# Patient Record
Sex: MALE | Race: WHITE | NOT HISPANIC OR LATINO | Employment: FULL TIME | URBAN - METROPOLITAN AREA
[De-identification: names, ages, dates, MRNs, and addresses within clinical notes are randomized per-mention and may not be internally consistent; named-entity substitution may affect disease eponyms.]

---

## 2022-07-05 ENCOUNTER — OFFICE VISIT (OUTPATIENT)
Dept: URGENT CARE | Facility: CLINIC | Age: 42
End: 2022-07-05
Payer: COMMERCIAL

## 2022-07-05 VITALS
RESPIRATION RATE: 18 BRPM | BODY MASS INDEX: 20.57 KG/M2 | HEIGHT: 66 IN | SYSTOLIC BLOOD PRESSURE: 144 MMHG | TEMPERATURE: 98 F | HEART RATE: 74 BPM | DIASTOLIC BLOOD PRESSURE: 83 MMHG | WEIGHT: 128 LBS | OXYGEN SATURATION: 99 %

## 2022-07-05 DIAGNOSIS — M25.562 LEFT KNEE PAIN, UNSPECIFIED CHRONICITY: Primary | ICD-10-CM

## 2022-07-05 PROCEDURE — 99213 OFFICE O/P EST LOW 20 MIN: CPT | Mod: S$GLB,,,

## 2022-07-05 PROCEDURE — 3079F DIAST BP 80-89 MM HG: CPT | Mod: CPTII,S$GLB,,

## 2022-07-05 PROCEDURE — 73562 X-RAY EXAM OF KNEE 3: CPT | Mod: FY,LT,S$GLB, | Performed by: RADIOLOGY

## 2022-07-05 PROCEDURE — 1160F RVW MEDS BY RX/DR IN RCRD: CPT | Mod: CPTII,S$GLB,,

## 2022-07-05 PROCEDURE — 1160F PR REVIEW ALL MEDS BY PRESCRIBER/CLIN PHARMACIST DOCUMENTED: ICD-10-PCS | Mod: CPTII,S$GLB,,

## 2022-07-05 PROCEDURE — 99213 PR OFFICE/OUTPT VISIT, EST, LEVL III, 20-29 MIN: ICD-10-PCS | Mod: S$GLB,,,

## 2022-07-05 PROCEDURE — 73562 XR KNEE 3 VIEW LEFT: ICD-10-PCS | Mod: FY,LT,S$GLB, | Performed by: RADIOLOGY

## 2022-07-05 PROCEDURE — 3079F PR MOST RECENT DIASTOLIC BLOOD PRESSURE 80-89 MM HG: ICD-10-PCS | Mod: CPTII,S$GLB,,

## 2022-07-05 PROCEDURE — 3008F PR BODY MASS INDEX (BMI) DOCUMENTED: ICD-10-PCS | Mod: CPTII,S$GLB,,

## 2022-07-05 PROCEDURE — 3008F BODY MASS INDEX DOCD: CPT | Mod: CPTII,S$GLB,,

## 2022-07-05 PROCEDURE — 3077F PR MOST RECENT SYSTOLIC BLOOD PRESSURE >= 140 MM HG: ICD-10-PCS | Mod: CPTII,S$GLB,,

## 2022-07-05 PROCEDURE — 3077F SYST BP >= 140 MM HG: CPT | Mod: CPTII,S$GLB,,

## 2022-07-05 PROCEDURE — 1159F PR MEDICATION LIST DOCUMENTED IN MEDICAL RECORD: ICD-10-PCS | Mod: CPTII,S$GLB,,

## 2022-07-05 PROCEDURE — 1159F MED LIST DOCD IN RCRD: CPT | Mod: CPTII,S$GLB,,

## 2022-07-05 RX ORDER — NAPROXEN 500 MG/1
500 TABLET ORAL 2 TIMES DAILY
Qty: 10 TABLET | Refills: 0 | Status: SHIPPED | OUTPATIENT
Start: 2022-07-05 | End: 2022-07-10

## 2022-07-05 NOTE — PROGRESS NOTES
Subjective:       Patient ID: Walter Ortiz is a 41 y.o. male.    Vitals:  vitals were not taken for this visit.     Chief Complaint: No chief complaint on file.    Patient presents to the clinic with pain in the left knee x Saturday. Patient has prior knee problems.     Knee Injury  This is a recurrent problem. Episode onset: 4 days  The problem occurs constantly. The problem has been gradually worsening. Associated symptoms include arthralgias and joint swelling. Pertinent negatives include no abdominal pain, chest pain, chills, diaphoresis, fatigue, fever, myalgias, nausea, neck pain, rash or vomiting. The symptoms are aggravated by walking, twisting, standing and bending. He has tried ice and NSAIDs for the symptoms. The treatment provided moderate relief.       Constitution: Negative. Negative for activity change, appetite change, chills, sweating, fatigue, fever and generalized weakness.   HENT: Negative.  Negative for ear pain, ear discharge, postnasal drip, sinus pain and sinus pressure.    Neck: neck negative. Negative for neck pain, neck stiffness and painful lymph nodes.   Cardiovascular: Negative.  Negative for chest pain, leg swelling, palpitations and sob on exertion.   Respiratory: Negative.  Negative for chest tightness, shortness of breath, stridor and wheezing.    Gastrointestinal: Negative.  Negative for abdominal pain, nausea, vomiting and constipation.   Endocrine: negative. cold intolerance.   Genitourinary: Negative.  Negative for dysuria and frequency.   Musculoskeletal: Positive for pain, trauma, joint pain, joint swelling and pain with walking. Negative for abnormal ROM of joint, back pain, muscle cramps, muscle ache and history of spine disorder.        L knee pain and swelling. PT reports having knee pain on and off for years, but this knee pain episode started after twisting knee cutting grass Saturday. Reports pain to medial knee with swelling. Reports knee pain full to bend.     Skin: Negative.  Negative for rash and hives.   Allergic/Immunologic: Negative for hives.   Neurological: Negative.  Negative for dizziness, light-headedness, passing out, disorientation and altered mental status.   Hematologic/Lymphatic: Negative.  Negative for swollen lymph nodes and easy bruising/bleeding. Does not bruise/bleed easily.   Psychiatric/Behavioral: Negative.  Negative for altered mental status, disorientation and confusion.       Objective:      Physical Exam   Constitutional: He is oriented to person, place, and time. He appears well-developed. He is cooperative.  Non-toxic appearance. He does not appear ill. No distress.   HENT:   Head: Normocephalic and atraumatic.   Ears:   Right Ear: Hearing, tympanic membrane, external ear and ear canal normal.   Left Ear: Hearing, tympanic membrane, external ear and ear canal normal.   Nose: Nose normal. No mucosal edema, rhinorrhea or nasal deformity. No epistaxis. Right sinus exhibits no maxillary sinus tenderness and no frontal sinus tenderness. Left sinus exhibits no maxillary sinus tenderness and no frontal sinus tenderness.   Mouth/Throat: Uvula is midline, oropharynx is clear and moist and mucous membranes are normal. No trismus in the jaw. Normal dentition. No uvula swelling. No posterior oropharyngeal erythema.   Eyes: Conjunctivae and lids are normal. Right eye exhibits no discharge. Left eye exhibits no discharge. No scleral icterus.   Neck: Trachea normal and phonation normal. Neck supple.   Cardiovascular: Normal rate, regular rhythm, normal heart sounds and normal pulses.   Pulmonary/Chest: Effort normal and breath sounds normal. No respiratory distress.   Abdominal: Normal appearance and bowel sounds are normal. He exhibits no distension and no mass. Soft. There is no abdominal tenderness.   Musculoskeletal:         General: No deformity.      Right knee: Normal. He exhibits normal range of motion, no swelling, no effusion, no ecchymosis, no  deformity, no laceration, no erythema, normal alignment, no LCL laxity, normal patellar mobility, no bony tenderness, normal meniscus and no MCL laxity.      Left knee: He exhibits decreased range of motion and swelling. He exhibits no effusion, no ecchymosis, no deformity, no laceration, no erythema, normal alignment, no LCL laxity, normal patellar mobility, no bony tenderness, normal meniscus and no MCL laxity. Tenderness found. Medial joint line tenderness noted.      Right ankle: Normal. He exhibits normal range of motion, no swelling, no ecchymosis, no deformity, no laceration and normal pulse. No tenderness. Achilles tendon normal. Achilles tendon exhibits no defect and normal Mackey's test results.      Left ankle: Normal. He exhibits normal range of motion, no swelling, no ecchymosis, no deformity, no laceration and normal pulse. No tenderness. Achilles tendon normal. Achilles tendon exhibits no defect and normal Mackey's test results.      Right upper leg: Normal. He exhibits no tenderness, no bony tenderness, no swelling, no edema, no deformity and no laceration.      Left upper leg: Normal. He exhibits no tenderness, no swelling, no edema, no deformity and no laceration.      Right lower leg: Normal. He exhibits no tenderness, no bony tenderness, no swelling, no deformity and no laceration. No edema.      Left lower leg: Normal. He exhibits no tenderness, no bony tenderness, no swelling, no deformity and no laceration.      Right foot: Normal. Normal capillary refill. No swelling or deformity.      Left foot: Normal. Normal capillary refill. No swelling or deformity.   Neurological: He is alert and oriented to person, place, and time. He exhibits normal muscle tone. Coordination normal.   Skin: Skin is warm, dry, intact, not diaphoretic and not pale. not left upper leg, not right upper leg, not left lower leg, not right lower leg, not left knee, not right knee, not left ankle and not right  ankle  Psychiatric: His speech is normal and behavior is normal. Judgment and thought content normal.   Nursing note and vitals reviewed.      XR KNEE 3 VIEW LEFT    Result Date: 7/5/2022  EXAMINATION: XR KNEE 3 VIEW LEFT CLINICAL HISTORY: Pain in left knee TECHNIQUE: AP, lateral, and Merchant views of the left knee were performed. COMPARISON: None. FINDINGS: No acute fracture or dislocation.  There is a subtle small cortical lucency in the distal medial femoral shaft measuring roughly 1.3 cm in size.  Soft tissues are symmetric.  No unexpected radiopaque foreign body.     No displaced fracture. Subtle small eccentric cortical lucency in the distal medial femoral shaft, a nonspecific finding.  Suggest correlation with symptoms and physical exam and consider orthopedic follow-up as clinically warranted.  This could be further evaluated with MRI if the patient has other clinical findings which may warrant knee MRI evaluation. Electronically signed by: Kale Cochran MD Date:    07/05/2022 Time:    11:53  Assessment:       No diagnosis found.      Plan:         There are no diagnoses linked to this encounter.       Medical Decision Making:   Initial Assessment:   PT in room AAOX4, skin W/D, resp E/U, non toxic in appearance, on examination PT reports pain with any movement of L knee.  NAD.  Urgent Care Management:  Discussed Xray results with PT. Discussed this is likely a strain of L knee and ortho pedic referral has been placed. Discussed If your condition worsens or fails to improve we recommend that you receive another evaluation at the ER immediately or contact your PCP to discuss your concerns or return here. Reveiwed the patient will arrange for follouwp care as instructed. Discussed Tylenol or ibuprofen can also be used as directed for pain unless you have an allergy to them or medical condition such as stomach ulcers, kidney or liver disease or blood thinners etc for which you should not be taking these type  of medications.   reviewed naprosyn was given here take as prescribed and do not also take any over the counter NSAID like ibuprofen, aleve, advil, motrin etc   Discussed RICE which means rest, ice compression and elevation are helpful.   Reviewed IF PT develop severe pain, increase in symptoms such as pain go to ED.   Also discussed was to PT to wear home knee splint wear it at all times until knee is healed or follows up with PCP or orthopedics.  PT agrees with treatment and plan, and verbalized understanding. PT ambulatory from clinic with family NAD.

## 2022-07-05 NOTE — PATIENT INSTRUCTIONS
Orthopedic Discharge Instructions    If your condition worsens or fails to improve we recommend that you receive another evaluation at the ER immediately or contact your PCP to discuss your concerns or return here. You must understand that you've received an urgent care treatment only and that you may be released before all your medical problems are known or treated. You the patient will arrange for follouwp care as instructed.   If you were prescribed a narcotic or muscle relaxant do not drive or operate heavy machinery while taking these medications   Tylenol or ibuprofen can also be used as directed for pain unless you have an allergy to them or medical condition such as stomach ulcers, kidney or liver disease or blood thinners etc for which you should not be taking these type of medications.   If you were given a prescription NSAID here do not also take any over the counter NSAID like ibuprofen, aleve, advil, motrin etc   RICE which means rest, ice compression and elevation are helpful.   If you have Low Back Pain and develop bowel or bladder symptoms or increase pain going down your legs go to the ER immediately.   IF you develop severe pain, increase in symptoms such as pain go to ED.  A orthopedic referral has been placed for you.   If you were given a splint wear it at all times.   If you were given crutches use them as we instructed. Do not rest your armpits on the foam pad or you risk compressing the nerves and the vessels there   Follow up with the orthopedist in 1 week if you continue with pain.   Sometimes it can take up to 1 week for stress fractures to show up on an X-ray, and may need reimaging or a CT or MRI of the affected area.

## 2022-07-05 NOTE — LETTER
July 5, 2022      [facility]  [address] [place] [address]  Phone: [phone]  Fax: [phone]       Patient: Walter Ortiz   YOB: 1980  Date of Visit: 07/05/2022    To Whom It May Concern:    Dayami Ortiz  was at [facility] on 07/05/2022. The patient may return to work/school on 07/08/2022 with no restrictions. If you have any questions or concerns, or if I can be of further assistance, please do not hesitate to contact me.    Sincerely,    Hardy Cespedes NP

## 2022-08-24 ENCOUNTER — OFFICE VISIT (OUTPATIENT)
Dept: URGENT CARE | Facility: CLINIC | Age: 42
End: 2022-08-24
Payer: COMMERCIAL

## 2022-08-24 VITALS
WEIGHT: 128 LBS | DIASTOLIC BLOOD PRESSURE: 99 MMHG | HEART RATE: 75 BPM | BODY MASS INDEX: 20.57 KG/M2 | SYSTOLIC BLOOD PRESSURE: 156 MMHG | TEMPERATURE: 98 F | OXYGEN SATURATION: 95 % | HEIGHT: 66 IN | RESPIRATION RATE: 19 BRPM

## 2022-08-24 DIAGNOSIS — S69.92XA INJURY OF LEFT THUMB, INITIAL ENCOUNTER: Primary | ICD-10-CM

## 2022-08-24 PROCEDURE — 99214 OFFICE O/P EST MOD 30 MIN: CPT | Mod: S$GLB,,, | Performed by: NURSE PRACTITIONER

## 2022-08-24 PROCEDURE — 73130 X-RAY EXAM OF HAND: CPT | Mod: FY,LT,S$GLB, | Performed by: RADIOLOGY

## 2022-08-24 PROCEDURE — 3080F DIAST BP >= 90 MM HG: CPT | Mod: CPTII,S$GLB,, | Performed by: NURSE PRACTITIONER

## 2022-08-24 PROCEDURE — 73130 XR HAND COMPLETE 3 VIEW LEFT: ICD-10-PCS | Mod: FY,LT,S$GLB, | Performed by: RADIOLOGY

## 2022-08-24 PROCEDURE — 3008F BODY MASS INDEX DOCD: CPT | Mod: CPTII,S$GLB,, | Performed by: NURSE PRACTITIONER

## 2022-08-24 PROCEDURE — 3077F SYST BP >= 140 MM HG: CPT | Mod: CPTII,S$GLB,, | Performed by: NURSE PRACTITIONER

## 2022-08-24 PROCEDURE — 1159F PR MEDICATION LIST DOCUMENTED IN MEDICAL RECORD: ICD-10-PCS | Mod: CPTII,S$GLB,, | Performed by: NURSE PRACTITIONER

## 2022-08-24 PROCEDURE — 3080F PR MOST RECENT DIASTOLIC BLOOD PRESSURE >= 90 MM HG: ICD-10-PCS | Mod: CPTII,S$GLB,, | Performed by: NURSE PRACTITIONER

## 2022-08-24 PROCEDURE — 3008F PR BODY MASS INDEX (BMI) DOCUMENTED: ICD-10-PCS | Mod: CPTII,S$GLB,, | Performed by: NURSE PRACTITIONER

## 2022-08-24 PROCEDURE — 1159F MED LIST DOCD IN RCRD: CPT | Mod: CPTII,S$GLB,, | Performed by: NURSE PRACTITIONER

## 2022-08-24 PROCEDURE — 1160F RVW MEDS BY RX/DR IN RCRD: CPT | Mod: CPTII,S$GLB,, | Performed by: NURSE PRACTITIONER

## 2022-08-24 PROCEDURE — 3077F PR MOST RECENT SYSTOLIC BLOOD PRESSURE >= 140 MM HG: ICD-10-PCS | Mod: CPTII,S$GLB,, | Performed by: NURSE PRACTITIONER

## 2022-08-24 PROCEDURE — 1160F PR REVIEW ALL MEDS BY PRESCRIBER/CLIN PHARMACIST DOCUMENTED: ICD-10-PCS | Mod: CPTII,S$GLB,, | Performed by: NURSE PRACTITIONER

## 2022-08-24 PROCEDURE — 99214 PR OFFICE/OUTPT VISIT, EST, LEVL IV, 30-39 MIN: ICD-10-PCS | Mod: S$GLB,,, | Performed by: NURSE PRACTITIONER

## 2022-08-24 NOTE — PROGRESS NOTES
"Subjective:       Patient ID: Walter Ortiz is a 41 y.o. male.    Vitals:  height is 5' 6" (1.676 m) and weight is 58.1 kg (128 lb). His temperature is 98.2 °F (36.8 °C). His blood pressure is 156/99 (abnormal) and his pulse is 75. His respiration is 19 and oxygen saturation is 95%.     Chief Complaint: Hand Pain (Finger injury)    41 yr old male came in with an injury to his thumb on his left hand. He was working on a house and hit it with the hammer. This injury occurred 3 weeks ago.  Denies numbness or tingling, denies head trauma injury, denies radiating pain, patient reports he did the ice and elevation and did wear the splint also but still has swelling to left thumb,denies fever, body aches or chills, denies cough, wheezing or shortness of breath, denies nausea, vomiting, diarrhea or abdominal pain, denies chest pain or dizziness positional lightheadedness, denies sore throat or trouble swallowing, denies loss of taste or smell, or any other symptoms      Hand Pain   The incident occurred more than 1 week ago. The incident occurred at home. Injury mechanism: hammer. The pain is present in the left fingers. The quality of the pain is described as aching. The pain does not radiate. The pain is at a severity of 6/10. The pain is mild. The pain has been constant since the incident. Nothing aggravates the symptoms. He has tried nothing for the symptoms.     ROS    Objective:      Physical Exam   Constitutional: He is oriented to person, place, and time. He appears well-developed. He is cooperative.  Non-toxic appearance. He does not appear ill. No distress.   HENT:   Head: Normocephalic and atraumatic. Head is without abrasion, without contusion and without laceration.   Ears:   Right Ear: Hearing, tympanic membrane, external ear and ear canal normal. No hemotympanum.   Left Ear: Hearing, tympanic membrane, external ear and ear canal normal. No hemotympanum.   Nose: Nose normal. No mucosal edema, rhinorrhea " or nasal deformity. No epistaxis. Right sinus exhibits no maxillary sinus tenderness and no frontal sinus tenderness. Left sinus exhibits no maxillary sinus tenderness and no frontal sinus tenderness.   Mouth/Throat: Uvula is midline, oropharynx is clear and moist and mucous membranes are normal. No trismus in the jaw. Normal dentition. No uvula swelling. No posterior oropharyngeal erythema.   Eyes: Conjunctivae, EOM and lids are normal. Pupils are equal, round, and reactive to light. Right eye exhibits no discharge. Left eye exhibits no discharge. No scleral icterus.   Neck: Trachea normal and phonation normal. Neck supple. No tracheal deviation present. No neck rigidity present. No spinous process tenderness present. No muscular tenderness present.   Cardiovascular: Normal rate, regular rhythm, normal heart sounds and normal pulses.   Pulmonary/Chest: Effort normal and breath sounds normal. No respiratory distress.   Abdominal: Normal appearance and bowel sounds are normal. He exhibits no distension, no pulsatile midline mass and no mass. Soft. There is no abdominal tenderness.   Musculoskeletal:         General: No deformity.      Left hand: He exhibits decreased range of motion (left thumb, unable to flex) and swelling (mild swelling to thumb). He exhibits no tenderness, no bony tenderness, normal two-point discrimination, normal capillary refill, no deformity and no laceration. Normal sensation noted. Normal strength noted.      Comments: Cap refill 2 seconds, left radial pulse 2+, sensation intact  No erythema or tenderness noted   Neurological: He is alert and oriented to person, place, and time. He has normal strength. No cranial nerve deficit or sensory deficit. He exhibits normal muscle tone. He displays no seizure activity. Coordination normal. GCS eye subscore is 4. GCS verbal subscore is 5. GCS motor subscore is 6.   Skin: Skin is warm, dry, intact, not diaphoretic and not pale. Capillary refill takes  less than 2 seconds. No abrasion, No burn, No bruising and No ecchymosis   Psychiatric: His speech is normal and behavior is normal. Judgment and thought content normal.   Nursing note and vitals reviewed.      X-Ray Hand 3 view Left    Result Date: 8/24/2022  EXAMINATION: XR HAND COMPLETE 3 VIEW LEFT CLINICAL HISTORY: . Pain, unspecified TECHNIQUE: PA, lateral, and oblique views of the left hand were performed. COMPARISON: Correlation to right hand radiographs from 07/13/2013. FINDINGS: There is no acute fracture or dislocation. Alignment is normal. There is joint space narrowing of the 5th finger proximal interphalangeal joint.  Remaining joint spaces are maintained.  There are no erosive changes.     No acute osseous abnormality of the left hand. Electronically signed by: [place] Date:    08/24/2022 Time:    18:50      Patient in no acute distress.  Vitals reassuring.  Discussed results/diagnosis/plan in depth with patient in clinic. Strict precautions given to patient to monitor for worsening signs and symptoms. Advised to follow up with primary.All questions answered. Strict ER precautions given. If your symptoms worsens or fail to improve you should go to the Emergency Room. Discharge and follow-up instructions given verbally/printed. Discharge and follow-up instructions discussed with the patient who expressed understanding and willingness to comply with my recommendations.Patient voiced understanding and in agreement with current treatment plan.     Please be advised this text was dictated with [employer]obi[employer] software and may contain errors due to translation.      Assessment:       1. Injury of left thumb, initial encounter          Plan:         Injury of left thumb, initial encounter  -     X-Ray Hand 3 view Left; Future; Expected date: 08/24/2022  -     Ambulatory referral/consult to Hand Surgery           Medical Decision Making:   Clinical Tests:   Radiological Study: Ordered and Reviewed  Urgent Care  Management:  Patient in no acute distress.  Vitals reassuring.  On exam, patient is nontoxic-appearing afebrile.  Physical examination with patient unable to flex left thumb.  No edema or tenderness noted.  Minimal swelling noted. No concern for cellulitis or infection at this time.  X-ray results discussed with patient in detail.There is joint space narrowing of the 5th finger proximal interphalangeal joint.  Remaining joint spaces are maintained.   3 weeks old injury likely involving tendon/ligament.  I referred patient to Hand surgery for further evaluation.  Patient will continue with the splint.. Patient voiced understanding and in agreement with current treatment plan.       Patient Instructions     Rest - Rest the injured area, wear splint    Ice - Apply ice  to affected area for the first 24-48 hours (DO NOT APPLY ICE DIRECTLY TO THE SKIN.  DO NOT LEAVE ON AFFECTED BODY PART FOR MORE THAN 15 MINUTES AT AT TIME TO AVOID INJURY TO SOFT TISSUE)     Compression - Wear ACE wrap or splint provided for compression and comfort to help reduce pain and swelling    Elevate - Elevated affected area higher than your heart to reduce swelling    -  If you were prescribed an anti-inflammatory, please take as directed as needed for pain and inflammation. If you were not prescribed an anti-inflammatory, you can take Tylenol or ibuprofen over the counter as directed for pain unless you have an allergy to them or medical condition such as stomach ulcers, kidney or liver disease or blood thinners etc for which you should not be taking these type of medications.     Follow up with your PCP in 1 week or as needed if no improvement.      Repeat X ray in 1 week if you still have pain.    If your condition worsens or fails to improve we recommend that you receive another evaluation at the ER immediately or contact your PCP to discuss your concerns or return here.     You must understand that you've received an urgent care treatment  only and that you may be released before all your medical problems are known or treated.     You the patient will arrange for followup care as instructed.

## 2022-08-25 NOTE — PATIENT INSTRUCTIONS
Rest - Rest the injured area, wear splint    Ice - Apply ice  to affected area for the first 24-48 hours (DO NOT APPLY ICE DIRECTLY TO THE SKIN.  DO NOT LEAVE ON AFFECTED BODY PART FOR MORE THAN 15 MINUTES AT AT TIME TO AVOID INJURY TO SOFT TISSUE)     Compression - Wear ACE wrap or splint provided for compression and comfort to help reduce pain and swelling    Elevate - Elevated affected area higher than your heart to reduce swelling    -  If you were prescribed an anti-inflammatory, please take as directed as needed for pain and inflammation. If you were not prescribed an anti-inflammatory, you can take Tylenol or ibuprofen over the counter as directed for pain unless you have an allergy to them or medical condition such as stomach ulcers, kidney or liver disease or blood thinners etc for which you should not be taking these type of medications.     Follow up with your PCP in 1 week or as needed if no improvement.      Repeat X ray in 1 week if you still have pain.    If your condition worsens or fails to improve we recommend that you receive another evaluation at the ER immediately or contact your PCP to discuss your concerns or return here.     You must understand that you've received an urgent care treatment only and that you may be released before all your medical problems are known or treated.     You the patient will arrange for followup care as instructed.